# Patient Record
Sex: MALE | Race: WHITE | NOT HISPANIC OR LATINO
[De-identification: names, ages, dates, MRNs, and addresses within clinical notes are randomized per-mention and may not be internally consistent; named-entity substitution may affect disease eponyms.]

---

## 2017-07-10 ENCOUNTER — APPOINTMENT (OUTPATIENT)
Dept: INTERNAL MEDICINE | Facility: CLINIC | Age: 57
End: 2017-07-10

## 2017-09-20 ENCOUNTER — APPOINTMENT (OUTPATIENT)
Dept: INTERNAL MEDICINE | Facility: CLINIC | Age: 57
End: 2017-09-20
Payer: COMMERCIAL

## 2017-09-20 VITALS
TEMPERATURE: 97.6 F | HEIGHT: 69 IN | WEIGHT: 205 LBS | HEART RATE: 86 BPM | DIASTOLIC BLOOD PRESSURE: 72 MMHG | RESPIRATION RATE: 18 BRPM | BODY MASS INDEX: 30.36 KG/M2 | OXYGEN SATURATION: 98 % | SYSTOLIC BLOOD PRESSURE: 112 MMHG

## 2017-09-20 DIAGNOSIS — R06.09 OTHER FORMS OF DYSPNEA: ICD-10-CM

## 2017-09-20 PROCEDURE — 94729 DIFFUSING CAPACITY: CPT

## 2017-09-20 PROCEDURE — 94060 EVALUATION OF WHEEZING: CPT

## 2017-09-20 PROCEDURE — 94727 GAS DIL/WSHOT DETER LNG VOL: CPT

## 2017-09-20 PROCEDURE — 99214 OFFICE O/P EST MOD 30 MIN: CPT | Mod: 25

## 2017-12-18 ENCOUNTER — NON-APPOINTMENT (OUTPATIENT)
Age: 57
End: 2017-12-18

## 2017-12-18 ENCOUNTER — APPOINTMENT (OUTPATIENT)
Dept: CARDIOLOGY | Facility: CLINIC | Age: 57
End: 2017-12-18
Payer: COMMERCIAL

## 2017-12-18 VITALS
WEIGHT: 201 LBS | DIASTOLIC BLOOD PRESSURE: 89 MMHG | BODY MASS INDEX: 29.77 KG/M2 | OXYGEN SATURATION: 98 % | HEART RATE: 76 BPM | HEIGHT: 69 IN | SYSTOLIC BLOOD PRESSURE: 138 MMHG

## 2017-12-18 DIAGNOSIS — F17.200 NICOTINE DEPENDENCE, UNSPECIFIED, UNCOMPLICATED: ICD-10-CM

## 2017-12-18 DIAGNOSIS — R93.1 ABNORMAL FINDINGS ON DIAGNOSTIC IMAGING OF HEART AND CORONARY CIRCULATION: ICD-10-CM

## 2017-12-18 DIAGNOSIS — Z82.0 FAMILY HISTORY OF EPILEPSY AND OTHER DISEASES OF THE NERVOUS SYSTEM: ICD-10-CM

## 2017-12-18 DIAGNOSIS — Z72.0 TOBACCO USE: ICD-10-CM

## 2017-12-18 PROCEDURE — 93000 ELECTROCARDIOGRAM COMPLETE: CPT

## 2017-12-18 PROCEDURE — 99204 OFFICE O/P NEW MOD 45 MIN: CPT | Mod: 25

## 2018-01-10 ENCOUNTER — APPOINTMENT (OUTPATIENT)
Dept: CARDIOLOGY | Facility: CLINIC | Age: 58
End: 2018-01-10
Payer: COMMERCIAL

## 2018-01-10 PROCEDURE — 93015 CV STRESS TEST SUPVJ I&R: CPT

## 2018-01-15 ENCOUNTER — APPOINTMENT (OUTPATIENT)
Dept: CARDIOLOGY | Facility: CLINIC | Age: 58
End: 2018-01-15
Payer: COMMERCIAL

## 2018-01-15 VITALS
OXYGEN SATURATION: 97 % | BODY MASS INDEX: 29.77 KG/M2 | HEIGHT: 69 IN | HEART RATE: 73 BPM | SYSTOLIC BLOOD PRESSURE: 144 MMHG | WEIGHT: 201 LBS | DIASTOLIC BLOOD PRESSURE: 91 MMHG

## 2018-01-15 VITALS — HEIGHT: 69 IN | DIASTOLIC BLOOD PRESSURE: 80 MMHG | BODY MASS INDEX: 29.68 KG/M2 | SYSTOLIC BLOOD PRESSURE: 118 MMHG

## 2018-01-15 DIAGNOSIS — E78.5 HYPERLIPIDEMIA, UNSPECIFIED: ICD-10-CM

## 2018-01-15 DIAGNOSIS — I25.10 ATHEROSCLEROTIC HEART DISEASE OF NATIVE CORONARY ARTERY W/OUT ANGINA PECTORIS: ICD-10-CM

## 2018-01-15 DIAGNOSIS — D86.0 SARCOIDOSIS OF LUNG: ICD-10-CM

## 2018-01-15 DIAGNOSIS — R00.2 PALPITATIONS: ICD-10-CM

## 2018-01-15 DIAGNOSIS — R01.1 CARDIAC MURMUR, UNSPECIFIED: ICD-10-CM

## 2018-01-15 DIAGNOSIS — I10 ESSENTIAL (PRIMARY) HYPERTENSION: ICD-10-CM

## 2018-01-15 PROCEDURE — 99214 OFFICE O/P EST MOD 30 MIN: CPT

## 2018-01-15 RX ORDER — HYDROCHLOROTHIAZIDE 12.5 MG/1
12.5 TABLET ORAL
Refills: 0 | Status: ACTIVE | COMMUNITY

## 2018-01-15 RX ORDER — AMLODIPINE BESYLATE 10 MG/1
10 TABLET ORAL
Qty: 90 | Refills: 1 | Status: ACTIVE | COMMUNITY

## 2018-08-06 ENCOUNTER — APPOINTMENT (OUTPATIENT)
Dept: CARDIOLOGY | Facility: CLINIC | Age: 58
End: 2018-08-06

## 2018-09-12 ENCOUNTER — APPOINTMENT (OUTPATIENT)
Dept: INTERNAL MEDICINE | Facility: CLINIC | Age: 58
End: 2018-09-12

## 2019-03-24 ENCOUNTER — EMERGENCY (EMERGENCY)
Facility: HOSPITAL | Age: 59
LOS: 0 days | Discharge: HOME | End: 2019-03-24
Attending: EMERGENCY MEDICINE | Admitting: EMERGENCY MEDICINE
Payer: COMMERCIAL

## 2019-03-24 VITALS
RESPIRATION RATE: 18 BRPM | HEART RATE: 72 BPM | DIASTOLIC BLOOD PRESSURE: 105 MMHG | OXYGEN SATURATION: 99 % | SYSTOLIC BLOOD PRESSURE: 144 MMHG | TEMPERATURE: 98 F

## 2019-03-24 VITALS — WEIGHT: 190.04 LBS | HEIGHT: 70 IN

## 2019-03-24 DIAGNOSIS — M79.674 PAIN IN RIGHT TOE(S): ICD-10-CM

## 2019-03-24 DIAGNOSIS — M19.071 PRIMARY OSTEOARTHRITIS, RIGHT ANKLE AND FOOT: ICD-10-CM

## 2019-03-24 PROCEDURE — 73630 X-RAY EXAM OF FOOT: CPT | Mod: 26,RT

## 2019-03-24 PROCEDURE — 99283 EMERGENCY DEPT VISIT LOW MDM: CPT | Mod: 25

## 2019-03-24 NOTE — ED PROVIDER NOTE - PROGRESS NOTE DETAILS
XR neg fx.  Dx arthritis (crystal vs rel to trauma, no s/s infectious etiology).  Pain has improved significantly (took Advil just PTA)  Plan: NSAIDs, f/u podiatry

## 2019-03-24 NOTE — ED ADULT NURSE NOTE - OBJECTIVE STATEMENT
Pt states he hit his 5th toe on right foot on wall 2 days ago. Today he states he woke up in pain and the toe was red.

## 2019-03-24 NOTE — ED PROVIDER NOTE - OBJECTIVE STATEMENT
57 y/o male with no pertinent PMHx presents to the ED c/o right small toe pain and erythema since x3 days, worsening today. Two days ago pt reports that he accidently walked into the wall. States he had some right small toe pain initially but was able to walk and exercise on the treadmill fine. This morning, pt woke up with pain and surrounding erythema on right small toe. Took Advil this morning. Reports he can ambulate with some pain. 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 59 y/o male with no pertinent PMHx presents to the ED c/o right small toe pain and erythema x3 days, worsening today. Two days ago pt reports that he accidently walked into the wall. States he had some right small toe pain initially but was able to walk and exercise on the treadmill fine. This morning, pt woke up with pain and surrounding erythema on right small toe. Took Advil this morning. Reports he can ambulate with some pain. 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

## 2019-03-24 NOTE — ED PROVIDER NOTE - CARE PROVIDER_API CALL
Arley Andrea (MATT)  Orthopaedics Surgery  [address]  [address]  [place] [address]  Phone: [phone]  Fax: [phone]  Follow Up Time:

## 2019-03-24 NOTE — ED ADULT TRIAGE NOTE - CHIEF COMPLAINT QUOTE
Pt states he injured his right foot, 5th toe two nights ago when his foot kicked the wall at night accidentally. Now 5th right toe is swollen and painful. Pt is able to bear weight and is ambulatory

## 2019-03-24 NOTE — ED PROVIDER NOTE - MUSCULOSKELETAL, MLM
Spine appears normal, range of motion is not limited. +redness and tender swelling proximal right small toe.

## 2021-10-06 PROBLEM — I10 ESSENTIAL HYPERTENSION: Status: ACTIVE | Noted: 2017-09-20

## 2024-09-17 PROBLEM — Z78.9 OTHER SPECIFIED HEALTH STATUS: Chronic | Status: ACTIVE | Noted: 2019-03-24

## 2024-09-24 ENCOUNTER — APPOINTMENT (OUTPATIENT)
Dept: ORTHOPEDIC SURGERY | Facility: CLINIC | Age: 64
End: 2024-09-24
Payer: MEDICARE

## 2024-09-24 ENCOUNTER — APPOINTMENT (OUTPATIENT)
Dept: ORTHOPEDIC SURGERY | Facility: CLINIC | Age: 64
End: 2024-09-24

## 2024-09-24 ENCOUNTER — NON-APPOINTMENT (OUTPATIENT)
Age: 64
End: 2024-09-24

## 2024-09-24 DIAGNOSIS — M75.112 INCOMPLETE ROTATOR CUFF TEAR OR RUPTURE OF LEFT SHOULDER, NOT SPECIFIED AS TRAUMATIC: ICD-10-CM

## 2024-09-24 DIAGNOSIS — M75.22 BICIPITAL TENDINITIS, LEFT SHOULDER: ICD-10-CM

## 2024-09-24 DIAGNOSIS — S43.432A SUPERIOR GLENOID LABRUM LESION OF LEFT SHOULDER, INITIAL ENCOUNTER: ICD-10-CM

## 2024-09-24 PROCEDURE — 99204 OFFICE O/P NEW MOD 45 MIN: CPT

## 2024-09-24 PROCEDURE — 20611 DRAIN/INJ JOINT/BURSA W/US: CPT | Mod: LT

## 2024-09-25 NOTE — DISCUSSION/SUMMARY
[de-identified] : We had a thorough discussion regarding the nature of his pain, the pathophysiology, as well as all treatment options. Based on patient's physical exam, radiographs and MRI imaging, they have biceps tendinitis and a SLAP tear. At this time, I recommend a cortisone injection to the proximal biceps muscle as well as physical therapy. Patient was given prescription of formal physical therapy that he will perform 2x/wk for 6-8 wks. All questions were answered and the patient verbalized understanding. The patient is in agreement with this treatment plan.

## 2024-09-25 NOTE — PHYSICAL EXAM
[de-identified] : Physical Exam:  General: Well appearing, no acute distress  Neurologic: A&Ox3, No focal deficits  Head: NCAT without abrasions, lacerations, or ecchymosis to head, face, or scalp  Eyes: No scleral icterus, no gross abnormalities  Respiratory: Equal chest wall expansion bilaterally, no accessory muscle use  Lymphatic: No lymphadenopathy palpated  Skin: Warm and dry  Psychiatric: Normal mood and affect  Examination of the Left shoulder shows no obvious deformity, swelling or erythema. Mild tenderness to palpation over the anterior shoulder. No AC joint tenderness. The patient demonstrates active/passive ROM of Forward Flexion to 145 degrees, External Rotation to 40 degrees and Internal Rotation to a mid lumbar level. The patient has a positive Argueta and Neers test. No pain with cross body adduction, lift off testing, AC compression testing or Yergason testing. The patient has 4/5 strength to forward flexion with pronation, internal and external rotation. Compartments are soft and nontender. The patient has 2+ cap refill and sensation is intact in the hand.   Right shoulder shows no deformity. No tenderness to palpation over the biceps or AC joint. The patient has Forward Flexion to 170 degrees, External Rotation to 45 degrees and Internal Rotation to a mid lumbar level. 5/5 strength to forward flexion with pronation, internal and external rotation. Compartments are soft and nontender. 2+ cap refill. Sensation intact distally.  [de-identified] : 	 EXAM:  MRI LEFT SHOULDER WITHOUT CONTRAST  HISTORY: Shoulder pain.  TECHNIQUE:  Multiplanar, multi-sequence MRI of the left shoulder was obtained on a 3T scanner according to standard protocol without intravenous or intra-articular contrast.   COMPARISON:  No previous studies are available for comparison.  FINDINGS:    Osseous structures: No fracture or osteonecrosis.  Rotator cuff:  There is moderate supraspinatus tendinosis without tear. There is moderate infraspinatus tendinosis with a small delaminating interstitial insertional tear extending for a length of 5 mm anteriorly. There is moderate subscapularis tendinosis without tear. Normal teres minor tendon without tendinosis or tear. Normal rotator cuff muscles without edema or atrophy. No subacromial-subdeltoid bursitis.   Long bicipital tendon:  There is severe tendinosis and partial tearing of the intra-articular portion of the tendon of the long head of the biceps. The extra-articular portion is unremarkable. Located within bicipital groove without subluxation or dislocation. The rotator interval is unremarkable  Glenohumeral joint:  The glenohumeral joint is unremarkable. There is a tear of the superior labrum that extends posterior to the biceps anchor compatible with a SLAP tear. There is a small joint effusion.  Acromioclavicular joint and rotator cuff outlet:  There is moderate acromioclavicular joint arthropathy. The acromion is curved. No acromial spur.   Other:  Unremarkable quadrilateral and axillary spaces.   IMPRESSION:  MRI of the left shoulder demonstrates:  1.  Rotator cuff tendinosis and interstitial insertional tear of the anterior aspect of the infraspinatus tendon. 2.  Severe tendinosis and partial tearing of the intra-articular portion of the tendon of the long head of the biceps. 3.  Moderate acromioclavicular joint arthropathy. 4.  SLAP tear. 5.  Small effusion.  Thank you for the opportunity to participate in the care of this patient.     ZAKI JIANG MD  - Electronically Signed: 09- 11:30 AM  Physician to Physician Direct Line is: [phone]

## 2024-09-25 NOTE — DISCUSSION/SUMMARY
[de-identified] : We had a thorough discussion regarding the nature of his pain, the pathophysiology, as well as all treatment options. Based on patient's physical exam, radiographs and MRI imaging, they have biceps tendinitis and a SLAP tear. At this time, I recommend a cortisone injection to the proximal biceps muscle as well as physical therapy. Patient was given prescription of formal physical therapy that he will perform 2x/wk for 6-8 wks. All questions were answered and the patient verbalized understanding. The patient is in agreement with this treatment plan.

## 2024-09-25 NOTE — HISTORY OF PRESENT ILLNESS
[Worsening] : worsening [___ wks] : [unfilled] week(s) ago [de-identified] : The patient is a 64-year-old male presenting for an initial visit of left shoulder pain. About 5-6 weeks ago the patient slipped on water and caught his fall on an outstretched arm. He initially didn't feel; any pain, but his symptoms started to increase with activity. He states that he cannot sleep on his left side. He does have radiating sx down into his elbow, but nothing into his hand. He has been taking Motrin for the pain. He initially saw his PCP where she ordered an MRI. He presents today with an MRI of his left shoulder from [facility].

## 2024-09-25 NOTE — PROCEDURE
[de-identified] : Injection: US guided Left shoulder Proximal Biceps Tendon Sheath  A discussion was had with the patient regarding this procedure and all questions were answered. All risks, benefits and alternatives were discussed. These included but were not limited to bleeding, infection, and allergic reaction. Alcohol was used to clean the skin, and ChloraPrep was used to sterilize and prep the area in the posterior aspect of the left shoulder. Ethyl chloride spray was then used as a topical anesthetic. A 22-gauge needle was used to inject 1cc 1% xylocaine, 1cc 0.25% bupivacaine and 1cc of 40mg/mL triamcinolone acetonide into the left proximal biceps tendon sheath. Ultrasound guidance was used for localization. A sterile band aid was then applied. The patient tolerated the procedure well and there were no complications. Post injection instructions were given.

## 2024-09-25 NOTE — PROCEDURE
[de-identified] : Injection: US guided Left shoulder Proximal Biceps Tendon Sheath  A discussion was had with the patient regarding this procedure and all questions were answered. All risks, benefits and alternatives were discussed. These included but were not limited to bleeding, infection, and allergic reaction. Alcohol was used to clean the skin, and ChloraPrep was used to sterilize and prep the area in the posterior aspect of the left shoulder. Ethyl chloride spray was then used as a topical anesthetic. A 22-gauge needle was used to inject 1cc 1% xylocaine, 1cc 0.25% bupivacaine and 1cc of 40mg/mL triamcinolone acetonide into the left proximal biceps tendon sheath. Ultrasound guidance was used for localization. A sterile band aid was then applied. The patient tolerated the procedure well and there were no complications. Post injection instructions were given.

## 2024-09-25 NOTE — REVIEW OF SYSTEMS
[Negative] : Heme/Lymph [Joint Pain] : joint pain [Joint Stiffness] : joint stiffness [[id number]] : Left shoulder

## 2024-09-25 NOTE — HISTORY OF PRESENT ILLNESS
[Worsening] : worsening [___ wks] : [unfilled] week(s) ago [de-identified] : The patient is a 64-year-old male presenting for an initial visit of left shoulder pain. About 5-6 weeks ago the patient slipped on water and caught his fall on an outstretched arm. He initially didn't feel; any pain, but his symptoms started to increase with activity. He states that he cannot sleep on his left side. He does have radiating sx down into his elbow, but nothing into his hand. He has been taking Motrin for the pain. He initially saw his PCP where she ordered an MRI. He presents today with an MRI of his left shoulder from [facility].

## 2024-09-25 NOTE — PHYSICAL EXAM
[de-identified] : Physical Exam:  General: Well appearing, no acute distress  Neurologic: A&Ox3, No focal deficits  Head: NCAT without abrasions, lacerations, or ecchymosis to head, face, or scalp  Eyes: No scleral icterus, no gross abnormalities  Respiratory: Equal chest wall expansion bilaterally, no accessory muscle use  Lymphatic: No lymphadenopathy palpated  Skin: Warm and dry  Psychiatric: Normal mood and affect  Examination of the Left shoulder shows no obvious deformity, swelling or erythema. Mild tenderness to palpation over the anterior shoulder. No AC joint tenderness. The patient demonstrates active/passive ROM of Forward Flexion to 145 degrees, External Rotation to 40 degrees and Internal Rotation to a mid lumbar level. The patient has a positive Argueta and Neers test. No pain with cross body adduction, lift off testing, AC compression testing or Yergason testing. The patient has 4/5 strength to forward flexion with pronation, internal and external rotation. Compartments are soft and nontender. The patient has 2+ cap refill and sensation is intact in the hand.   Right shoulder shows no deformity. No tenderness to palpation over the biceps or AC joint. The patient has Forward Flexion to 170 degrees, External Rotation to 45 degrees and Internal Rotation to a mid lumbar level. 5/5 strength to forward flexion with pronation, internal and external rotation. Compartments are soft and nontender. 2+ cap refill. Sensation intact distally.  [de-identified] : 	 EXAM:  MRI LEFT SHOULDER WITHOUT CONTRAST  HISTORY: Shoulder pain.  TECHNIQUE:  Multiplanar, multi-sequence MRI of the left shoulder was obtained on a 3T scanner according to standard protocol without intravenous or intra-articular contrast.   COMPARISON:  No previous studies are available for comparison.  FINDINGS:    Osseous structures: No fracture or osteonecrosis.  Rotator cuff:  There is moderate supraspinatus tendinosis without tear. There is moderate infraspinatus tendinosis with a small delaminating interstitial insertional tear extending for a length of 5 mm anteriorly. There is moderate subscapularis tendinosis without tear. Normal teres minor tendon without tendinosis or tear. Normal rotator cuff muscles without edema or atrophy. No subacromial-subdeltoid bursitis.   Long bicipital tendon:  There is severe tendinosis and partial tearing of the intra-articular portion of the tendon of the long head of the biceps. The extra-articular portion is unremarkable. Located within bicipital groove without subluxation or dislocation. The rotator interval is unremarkable  Glenohumeral joint:  The glenohumeral joint is unremarkable. There is a tear of the superior labrum that extends posterior to the biceps anchor compatible with a SLAP tear. There is a small joint effusion.  Acromioclavicular joint and rotator cuff outlet:  There is moderate acromioclavicular joint arthropathy. The acromion is curved. No acromial spur.   Other:  Unremarkable quadrilateral and axillary spaces.   IMPRESSION:  MRI of the left shoulder demonstrates:  1.  Rotator cuff tendinosis and interstitial insertional tear of the anterior aspect of the infraspinatus tendon. 2.  Severe tendinosis and partial tearing of the intra-articular portion of the tendon of the long head of the biceps. 3.  Moderate acromioclavicular joint arthropathy. 4.  SLAP tear. 5.  Small effusion.  Thank you for the opportunity to participate in the care of this patient.     ZAKI JIANG MD  - Electronically Signed: 09- 11:30 AM  Physician to Physician Direct Line is: [phone]

## 2024-12-22 ENCOUNTER — EMERGENCY (EMERGENCY)
Facility: HOSPITAL | Age: 64
LOS: 0 days | Discharge: HOME | End: 2024-12-22
Attending: STUDENT IN AN ORGANIZED HEALTH CARE EDUCATION/TRAINING PROGRAM
Payer: MEDICARE

## 2024-12-22 VITALS
SYSTOLIC BLOOD PRESSURE: 157 MMHG | RESPIRATION RATE: 18 BRPM | HEART RATE: 70 BPM | OXYGEN SATURATION: 100 % | DIASTOLIC BLOOD PRESSURE: 104 MMHG | TEMPERATURE: 98 F

## 2024-12-22 VITALS
DIASTOLIC BLOOD PRESSURE: 84 MMHG | HEART RATE: 71 BPM | SYSTOLIC BLOOD PRESSURE: 138 MMHG | RESPIRATION RATE: 16 BRPM | OXYGEN SATURATION: 99 % | TEMPERATURE: 98 F

## 2024-12-22 DIAGNOSIS — I10 ESSENTIAL (PRIMARY) HYPERTENSION: ICD-10-CM

## 2024-12-22 DIAGNOSIS — R07.89 OTHER CHEST PAIN: ICD-10-CM

## 2024-12-22 DIAGNOSIS — R07.81 PLEURODYNIA: ICD-10-CM

## 2024-12-22 DIAGNOSIS — Z87.39 PERSONAL HISTORY OF OTHER DISEASES OF THE MUSCULOSKELETAL SYSTEM AND CONNECTIVE TISSUE: ICD-10-CM

## 2024-12-22 DIAGNOSIS — D86.9 SARCOIDOSIS, UNSPECIFIED: ICD-10-CM

## 2024-12-22 LAB
ALBUMIN SERPL ELPH-MCNC: 3.9 G/DL — SIGNIFICANT CHANGE UP (ref 3.3–5)
ALP SERPL-CCNC: 58 U/L — SIGNIFICANT CHANGE UP (ref 40–120)
ALT FLD-CCNC: 37 U/L — SIGNIFICANT CHANGE UP (ref 12–78)
ANION GAP SERPL CALC-SCNC: 4 MMOL/L — LOW (ref 5–17)
AST SERPL-CCNC: 29 U/L — SIGNIFICANT CHANGE UP (ref 15–37)
BASOPHILS # BLD AUTO: 0.1 K/UL — SIGNIFICANT CHANGE UP (ref 0–0.2)
BASOPHILS NFR BLD AUTO: 1.5 % — SIGNIFICANT CHANGE UP (ref 0–2)
BILIRUB SERPL-MCNC: 0.5 MG/DL — SIGNIFICANT CHANGE UP (ref 0.2–1.2)
BUN SERPL-MCNC: 16 MG/DL — SIGNIFICANT CHANGE UP (ref 7–23)
CALCIUM SERPL-MCNC: 8.6 MG/DL — SIGNIFICANT CHANGE UP (ref 8.5–10.1)
CHLORIDE SERPL-SCNC: 103 MMOL/L — SIGNIFICANT CHANGE UP (ref 96–108)
CO2 SERPL-SCNC: 29 MMOL/L — SIGNIFICANT CHANGE UP (ref 22–31)
CREAT SERPL-MCNC: 0.89 MG/DL — SIGNIFICANT CHANGE UP (ref 0.5–1.3)
EGFR: 95 ML/MIN/1.73M2 — SIGNIFICANT CHANGE UP
EOSINOPHIL # BLD AUTO: 0.22 K/UL — SIGNIFICANT CHANGE UP (ref 0–0.5)
EOSINOPHIL NFR BLD AUTO: 3.3 % — SIGNIFICANT CHANGE UP (ref 0–6)
GLUCOSE SERPL-MCNC: 101 MG/DL — HIGH (ref 70–99)
HCT VFR BLD CALC: 43.4 % — SIGNIFICANT CHANGE UP (ref 39–50)
HGB BLD-MCNC: 15.1 G/DL — SIGNIFICANT CHANGE UP (ref 13–17)
IMM GRANULOCYTES NFR BLD AUTO: 0.1 % — SIGNIFICANT CHANGE UP (ref 0–0.9)
LYMPHOCYTES # BLD AUTO: 2.42 K/UL — SIGNIFICANT CHANGE UP (ref 1–3.3)
LYMPHOCYTES # BLD AUTO: 36 % — SIGNIFICANT CHANGE UP (ref 13–44)
MAGNESIUM SERPL-MCNC: 1.9 MG/DL — SIGNIFICANT CHANGE UP (ref 1.6–2.6)
MCHC RBC-ENTMCNC: 29.8 PG — SIGNIFICANT CHANGE UP (ref 27–34)
MCHC RBC-ENTMCNC: 34.8 G/DL — SIGNIFICANT CHANGE UP (ref 32–36)
MCV RBC AUTO: 85.6 FL — SIGNIFICANT CHANGE UP (ref 80–100)
MONOCYTES # BLD AUTO: 0.55 K/UL — SIGNIFICANT CHANGE UP (ref 0–0.9)
MONOCYTES NFR BLD AUTO: 8.2 % — SIGNIFICANT CHANGE UP (ref 2–14)
NEUTROPHILS # BLD AUTO: 3.43 K/UL — SIGNIFICANT CHANGE UP (ref 1.8–7.4)
NEUTROPHILS NFR BLD AUTO: 50.9 % — SIGNIFICANT CHANGE UP (ref 43–77)
PLATELET # BLD AUTO: 254 K/UL — SIGNIFICANT CHANGE UP (ref 150–400)
POTASSIUM SERPL-MCNC: 3.8 MMOL/L — SIGNIFICANT CHANGE UP (ref 3.5–5.3)
POTASSIUM SERPL-SCNC: 3.8 MMOL/L — SIGNIFICANT CHANGE UP (ref 3.5–5.3)
PROT SERPL-MCNC: 7.7 GM/DL — SIGNIFICANT CHANGE UP (ref 6–8.3)
RBC # BLD: 5.07 M/UL — SIGNIFICANT CHANGE UP (ref 4.2–5.8)
RBC # FLD: 12.3 % — SIGNIFICANT CHANGE UP (ref 10.3–14.5)
SODIUM SERPL-SCNC: 136 MMOL/L — SIGNIFICANT CHANGE UP (ref 135–145)
TROPONIN I, HIGH SENSITIVITY RESULT: 58.41 NG/L — SIGNIFICANT CHANGE UP
WBC # BLD: 6.73 K/UL — SIGNIFICANT CHANGE UP (ref 3.8–10.5)
WBC # FLD AUTO: 6.73 K/UL — SIGNIFICANT CHANGE UP (ref 3.8–10.5)

## 2024-12-22 PROCEDURE — 36415 COLL VENOUS BLD VENIPUNCTURE: CPT

## 2024-12-22 PROCEDURE — 36000 PLACE NEEDLE IN VEIN: CPT

## 2024-12-22 PROCEDURE — 71045 X-RAY EXAM CHEST 1 VIEW: CPT

## 2024-12-22 PROCEDURE — 93005 ELECTROCARDIOGRAM TRACING: CPT

## 2024-12-22 PROCEDURE — 71045 X-RAY EXAM CHEST 1 VIEW: CPT | Mod: 26

## 2024-12-22 PROCEDURE — 84484 ASSAY OF TROPONIN QUANT: CPT

## 2024-12-22 PROCEDURE — 93010 ELECTROCARDIOGRAM REPORT: CPT

## 2024-12-22 PROCEDURE — 99284 EMERGENCY DEPT VISIT MOD MDM: CPT

## 2024-12-22 PROCEDURE — 83735 ASSAY OF MAGNESIUM: CPT

## 2024-12-22 PROCEDURE — 85025 COMPLETE CBC W/AUTO DIFF WBC: CPT

## 2024-12-22 PROCEDURE — 99285 EMERGENCY DEPT VISIT HI MDM: CPT | Mod: 25

## 2024-12-22 PROCEDURE — 80053 COMPREHEN METABOLIC PANEL: CPT

## 2024-12-22 NOTE — ED STATDOCS - PATIENT PORTAL LINK FT
You can access the FollowMyHealth Patient Portal offered by [facility] by registering at the following website: [email or web address]. By joining [employer]’s FollowMyHealth portal, you will also be able to view your health information using other applications (apps) compatible with our system.

## 2024-12-22 NOTE — ED STATDOCS - CLINICAL SUMMARY MEDICAL DECISION MAKING FREE TEXT BOX
65 y/o M with PMHx of HTN, sarcoidosis, L rotator cuff tear presents to the ED c/o intermittent, non-radiating L sided chest pain starting yesterday morning. States the pain feels similar to a pulled muscle and is pleuritic in nature. Will r/o ACS vs arrythmia, possible muscular pain. Plan to do labs, imaging, EKG, re-eval.

## 2024-12-22 NOTE — ED STATDOCS - OBJECTIVE STATEMENT
63 y/o M with PMHx of HTN, sarcoidosis, L rotator cuff tear presents to the ED c/o intermittent, non-radiating L sided chest pain starting yesterday morning. States the pain feels similar to a pulled muscle and is pleuritic in nature. States he exercises very frequently. Denies SOB, HA, dizziness. Also notes he slept funny 2 nights ago and thinks his pain may be due to that. Motrin taken this morning with relief. PCP [facility]. Pt follows with a Cardiologist, unsure of their name.

## 2024-12-22 NOTE — ED ADULT TRIAGE NOTE - CHIEF COMPLAINT QUOTE
Pt ambulatory to ED w/ left sided chest pain since yesterday morning. States pain radiates to left arm. 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 Hx of HTN.

## 2024-12-22 NOTE — ED STATDOCS - ATTENDING APP SHARED VISIT CONTRIBUTION OF CARE
I Demetrio Dukes DO have personally seen and examined this patient.  I have fully participated in the care of this patient.  The initial assessment was performed by myself and then the patient was handed off to the ACP. The patient was followed and re-evaluated by the ACP. All labs, imaging and procedures were evaluated and performed by the ACP and I was available for consultation if any questions in the patients care came up.I have made amendments to the documentation where appropriate and otherwise agree with the history, physical exam, and plan as documented by the [facility].

## 2024-12-22 NOTE — ED STATDOCS - PROGRESS NOTE DETAILS
Labs and imaging reviewed with patient with no actionable findings. As pain is reproducible, likely MSK. Advised cardiology follow up. - Brayan Washington PA-C

## 2025-06-15 ENCOUNTER — NON-APPOINTMENT (OUTPATIENT)
Age: 65
End: 2025-06-15